# Patient Record
Sex: MALE | Race: BLACK OR AFRICAN AMERICAN | URBAN - METROPOLITAN AREA
[De-identification: names, ages, dates, MRNs, and addresses within clinical notes are randomized per-mention and may not be internally consistent; named-entity substitution may affect disease eponyms.]

---

## 2018-01-02 ENCOUNTER — HISTORICAL (OUTPATIENT)
Dept: INTERNAL MEDICINE | Facility: CLINIC | Age: 53
End: 2018-01-02

## 2018-01-02 LAB
ABS NEUT (OLG): 3.55 X10(3)/MCL (ref 2.1–9.2)
ALBUMIN SERPL-MCNC: 3.7 GM/DL (ref 3.4–5)
ALBUMIN/GLOB SERPL: 1 RATIO (ref 1–2)
ALP SERPL-CCNC: 82 UNIT/L (ref 45–117)
ALT SERPL-CCNC: 46 UNIT/L (ref 12–78)
APPEARANCE, UA: CLEAR
AST SERPL-CCNC: 20 UNIT/L (ref 15–37)
BACTERIA #/AREA URNS AUTO: ABNORMAL /[HPF]
BASOPHILS # BLD AUTO: 0.03 X10(3)/MCL
BASOPHILS NFR BLD AUTO: 0 % (ref 0–1)
BILIRUB SERPL-MCNC: 0.6 MG/DL (ref 0.2–1)
BILIRUB UR QL STRIP: NEGATIVE
BILIRUBIN DIRECT+TOT PNL SERPL-MCNC: 0.1 MG/DL
BILIRUBIN DIRECT+TOT PNL SERPL-MCNC: 0.5 MG/DL
BUN SERPL-MCNC: 14 MG/DL (ref 7–18)
CALCIUM SERPL-MCNC: 8.5 MG/DL (ref 8.5–10.1)
CHLORIDE SERPL-SCNC: 105 MMOL/L (ref 98–107)
CHOLEST SERPL-MCNC: 180 MG/DL
CHOLEST/HDLC SERPL: 3.7 {RATIO} (ref 0–5)
CO2 SERPL-SCNC: 32 MMOL/L (ref 21–32)
COLOR UR: YELLOW
CREAT SERPL-MCNC: 0.8 MG/DL (ref 0.6–1.3)
EOSINOPHIL # BLD AUTO: 0.14 10*3/UL
EOSINOPHIL NFR BLD AUTO: 2 % (ref 0–5)
ERYTHROCYTE [DISTWIDTH] IN BLOOD BY AUTOMATED COUNT: 11.8 % (ref 11.5–14.5)
GLOBULIN SER-MCNC: 4.2 GM/ML (ref 2.3–3.5)
GLUCOSE (UA): >1000 MG/DL
GLUCOSE SERPL-MCNC: 203 MG/DL (ref 74–106)
HCT VFR BLD AUTO: 45.7 % (ref 40–51)
HDLC SERPL-MCNC: 49 MG/DL
HGB BLD-MCNC: 16.1 GM/DL (ref 13.5–17.5)
HGB UR QL STRIP: NEGATIVE
HIV 1+2 AB+HIV1 P24 AG SERPL QL IA: NONREACTIVE
HYALINE CASTS #/AREA URNS LPF: ABNORMAL /[LPF]
IMM GRANULOCYTES # BLD AUTO: 0.02 10*3/UL
IMM GRANULOCYTES NFR BLD AUTO: 0 %
KETONES UR QL STRIP: ABNORMAL
LDLC SERPL CALC-MCNC: 103 MG/DL (ref 0–130)
LEUKOCYTE ESTERASE UR QL STRIP: NEGATIVE
LYMPHOCYTES # BLD AUTO: 1.88 X10(3)/MCL
LYMPHOCYTES NFR BLD AUTO: 31 % (ref 15–40)
MCH RBC QN AUTO: 31.4 PG (ref 26–34)
MCHC RBC AUTO-ENTMCNC: 35.2 GM/DL (ref 31–37)
MCV RBC AUTO: 89.3 FL (ref 80–100)
MONOCYTES # BLD AUTO: 0.5 X10(3)/MCL
MONOCYTES NFR BLD AUTO: 8 % (ref 4–12)
NEUTROPHILS # BLD AUTO: 3.55 X10(3)/MCL
NEUTROPHILS NFR BLD AUTO: 58 X10(3)/MCL
NITRITE UR QL STRIP: NEGATIVE
PH UR STRIP: 5.5 [PH] (ref 4.5–8)
PLATELET # BLD AUTO: 185 X10(3)/MCL (ref 130–400)
PMV BLD AUTO: 10.8 FL (ref 7.4–10.4)
POTASSIUM SERPL-SCNC: 4.2 MMOL/L (ref 3.5–5.1)
PROT SERPL-MCNC: 7.9 GM/DL (ref 6.4–8.2)
PROT UR QL STRIP: 20 MG/DL
PSA SERPL-MCNC: 0.8 NG/ML
RBC # BLD AUTO: 5.12 X10(6)/MCL (ref 4.5–5.9)
RBC #/AREA URNS AUTO: ABNORMAL /[HPF]
SODIUM SERPL-SCNC: 141 MMOL/L (ref 136–145)
SP GR UR STRIP: 1.04 (ref 1–1.03)
SQUAMOUS #/AREA URNS LPF: ABNORMAL /[LPF]
TRIGL SERPL-MCNC: 141 MG/DL
TSH SERPL-ACNC: 0.99 MIU/L (ref 0.36–3.74)
UROBILINOGEN UR STRIP-ACNC: NORMAL
VLDLC SERPL CALC-MCNC: 28 MG/DL
WBC # SPEC AUTO: 6.1 X10(3)/MCL (ref 4.5–11)
WBC #/AREA URNS AUTO: ABNORMAL /HPF

## 2018-01-10 ENCOUNTER — HISTORICAL (OUTPATIENT)
Dept: INTERNAL MEDICINE | Facility: CLINIC | Age: 53
End: 2018-01-10

## 2018-01-15 ENCOUNTER — HISTORICAL (OUTPATIENT)
Dept: INTERNAL MEDICINE | Facility: CLINIC | Age: 53
End: 2018-01-15

## 2018-01-15 LAB
EST. AVERAGE GLUCOSE BLD GHB EST-MCNC: 237 MG/DL
HAV IGM SERPL QL IA: NONREACTIVE
HBA1C MFR BLD: 9.9 % (ref 4.2–6.3)
HBV CORE IGM SERPL QL IA: NONREACTIVE
HBV SURFACE AG SERPL QL IA: NEGATIVE
HCV AB SERPL QL IA: NONREACTIVE

## 2018-08-06 ENCOUNTER — HISTORICAL (OUTPATIENT)
Dept: ADMINISTRATIVE | Facility: HOSPITAL | Age: 53
End: 2018-08-06

## 2018-12-03 ENCOUNTER — HISTORICAL (OUTPATIENT)
Dept: INTERNAL MEDICINE | Facility: CLINIC | Age: 53
End: 2018-12-03

## 2018-12-03 LAB
ALBUMIN SERPL-MCNC: 3.5 GM/DL (ref 3.4–5)
ALBUMIN/GLOB SERPL: 1 RATIO (ref 1–2)
ALP SERPL-CCNC: 114 UNIT/L (ref 45–117)
ALT SERPL-CCNC: 90 UNIT/L (ref 12–78)
AST SERPL-CCNC: 30 UNIT/L (ref 15–37)
BILIRUB SERPL-MCNC: 0.7 MG/DL (ref 0.2–1)
BILIRUBIN DIRECT+TOT PNL SERPL-MCNC: 0.2 MG/DL
BILIRUBIN DIRECT+TOT PNL SERPL-MCNC: 0.5 MG/DL
BUN SERPL-MCNC: 14 MG/DL (ref 7–18)
CALCIUM SERPL-MCNC: 8.2 MG/DL (ref 8.5–10.1)
CHLORIDE SERPL-SCNC: 108 MMOL/L (ref 98–107)
CO2 SERPL-SCNC: 32 MMOL/L (ref 21–32)
CREAT SERPL-MCNC: 0.9 MG/DL (ref 0.6–1.3)
EST. AVERAGE GLUCOSE BLD GHB EST-MCNC: 177 MG/DL
GLOBULIN SER-MCNC: 3.9 GM/ML (ref 2.3–3.5)
GLUCOSE SERPL-MCNC: 123 MG/DL (ref 74–106)
HBA1C MFR BLD: 7.8 % (ref 4.2–6.3)
POTASSIUM SERPL-SCNC: 4.1 MMOL/L (ref 3.5–5.1)
PROT SERPL-MCNC: 7.4 GM/DL (ref 6.4–8.2)
SODIUM SERPL-SCNC: 143 MMOL/L (ref 136–145)

## 2019-09-23 ENCOUNTER — HISTORICAL (OUTPATIENT)
Dept: INTERNAL MEDICINE | Facility: CLINIC | Age: 54
End: 2019-09-23

## 2019-09-23 LAB
ABS NEUT (OLG): 4.7 X10(3)/MCL (ref 2.1–9.2)
ALBUMIN SERPL-MCNC: 3.5 GM/DL (ref 3.4–5)
ALBUMIN/GLOB SERPL: 0.9 RATIO (ref 1.1–2)
ALP SERPL-CCNC: 88 UNIT/L (ref 45–117)
ALT SERPL-CCNC: 30 UNIT/L (ref 12–78)
AST SERPL-CCNC: 17 UNIT/L (ref 15–37)
BASOPHILS # BLD AUTO: 0 X10(3)/MCL (ref 0–0.2)
BASOPHILS NFR BLD AUTO: 0 %
BILIRUB SERPL-MCNC: 0.5 MG/DL (ref 0.2–1)
BILIRUBIN DIRECT+TOT PNL SERPL-MCNC: 0.2 MG/DL (ref 0–0.2)
BILIRUBIN DIRECT+TOT PNL SERPL-MCNC: 0.3 MG/DL
BUN SERPL-MCNC: 16 MG/DL (ref 7–18)
CALCIUM SERPL-MCNC: 8.7 MG/DL (ref 8.5–10.1)
CHLORIDE SERPL-SCNC: 108 MMOL/L (ref 98–107)
CHOLEST SERPL-MCNC: 102 MG/DL
CHOLEST/HDLC SERPL: 2.6 {RATIO} (ref 0–5)
CO2 SERPL-SCNC: 30 MMOL/L (ref 21–32)
CREAT SERPL-MCNC: 0.9 MG/DL (ref 0.6–1.3)
EOSINOPHIL # BLD AUTO: 0.2 X10(3)/MCL (ref 0–0.9)
EOSINOPHIL NFR BLD AUTO: 2 %
ERYTHROCYTE [DISTWIDTH] IN BLOOD BY AUTOMATED COUNT: 12.4 % (ref 11.5–14.5)
EST. AVERAGE GLUCOSE BLD GHB EST-MCNC: 120 MG/DL
GLOBULIN SER-MCNC: 4 GM/ML (ref 2.3–3.5)
GLUCOSE SERPL-MCNC: 84 MG/DL (ref 74–106)
HBA1C MFR BLD: 5.8 % (ref 4.2–6.3)
HCT VFR BLD AUTO: 41.3 % (ref 40–51)
HDLC SERPL-MCNC: 40 MG/DL (ref 40–59)
HGB BLD-MCNC: 13.9 GM/DL (ref 13.5–17.5)
IMM GRANULOCYTES # BLD AUTO: 0.02 10*3/UL
IMM GRANULOCYTES NFR BLD AUTO: 0 %
LDLC SERPL CALC-MCNC: 49 MG/DL
LYMPHOCYTES # BLD AUTO: 2 X10(3)/MCL (ref 0.6–4.6)
LYMPHOCYTES NFR BLD AUTO: 26 %
MCH RBC QN AUTO: 31.5 PG (ref 26–34)
MCHC RBC AUTO-ENTMCNC: 33.7 GM/DL (ref 31–37)
MCV RBC AUTO: 93.7 FL (ref 80–100)
MONOCYTES # BLD AUTO: 0.7 X10(3)/MCL (ref 0.1–1.3)
MONOCYTES NFR BLD AUTO: 10 %
NEUTROPHILS # BLD AUTO: 4.7 X10(3)/MCL (ref 2.1–9.2)
NEUTROPHILS NFR BLD AUTO: 62 %
PLATELET # BLD AUTO: 212 X10(3)/MCL (ref 130–400)
PMV BLD AUTO: 10.5 FL (ref 7.4–10.4)
POTASSIUM SERPL-SCNC: 4.2 MMOL/L (ref 3.5–5.1)
PROT SERPL-MCNC: 7.5 GM/DL (ref 6.4–8.2)
PSA SERPL-MCNC: 0.7 NG/ML
RBC # BLD AUTO: 4.41 X10(6)/MCL (ref 4.5–5.9)
SODIUM SERPL-SCNC: 144 MMOL/L (ref 136–145)
TRIGL SERPL-MCNC: 67 MG/DL
TSH SERPL-ACNC: 1.73 MIU/L (ref 0.36–3.74)
VLDLC SERPL CALC-MCNC: 13 MG/DL
WBC # SPEC AUTO: 7.6 X10(3)/MCL (ref 4.5–11)

## 2022-04-11 ENCOUNTER — HISTORICAL (OUTPATIENT)
Dept: ADMINISTRATIVE | Facility: HOSPITAL | Age: 57
End: 2022-04-11

## 2022-04-27 VITALS
OXYGEN SATURATION: 99 % | SYSTOLIC BLOOD PRESSURE: 112 MMHG | HEIGHT: 67 IN | BODY MASS INDEX: 32.53 KG/M2 | DIASTOLIC BLOOD PRESSURE: 70 MMHG | WEIGHT: 207.25 LBS

## 2022-05-05 NOTE — HISTORICAL [facility] CERNER
This is a historical note converted from [employer]. Formatting and pictures may have been removed.  Please reference [employer] for original formatting and attached multimedia. Chief Complaint  pain rt leg and groin area/fall precautions/did not taken blood pressure medication this am  History of Present Illness  61y/o male here for [1] f/u. Pt has hx HTN, DM, Right hip pain, CVA with left sided weakness.? Pt ambulating with walker. [2]? States he is currently in PT and was informed by his Physical therapist that he needs sx and not PT. Informed pt referral to [facility] was denied and informed pt needed PT first. [3] Pt did not take BP meds today and reason for elevated BP.  Review of Systems  Constitutional: negative except as stated in HPI  Eye: negative except as stated in HPI  ENT: negative except as stated in HPI  Respiratory: negative except as stated in HPI  Cardiovascular: negative except as stated in HPI  Gastrointestinal: negative except as stated in HPI  Genitourinary: negative except as stated in HPI  Heme/Lymph: negative except as stated in HPI  Endocrine: negative except as stated in HPI  Immunologic: negative except as stated in HPI  Musculoskeletal: negative except as stated in HPI  Integumentary: negative except as stated in HPI  Neurologic: negative except as stated in HPI  ?   All Other ROS_ negative except as stated in HPI  ?  Physical Exam  Vitals & Measurements  T:?36.5? ?C (Oral)? HR:?55(Peripheral)? RR:?18? BP:?160/80?  HT:?170?cm? HT:?170?cm? WT:?95?kg? WT:?95?kg? BMI:?32.87?  General: Alert and oriented, No acute distress.  Eye: Pupils are equal, round and reactive to light, Extraocular movements are intact.  HENT: Normocephalic.  Neck: Supple, Non-tender, No carotid bruit, No lymphadenopathy.  Respiratory: Lungs are clear to auscultation, Respirations are non-labored, Breath sounds are equal, Symmetrical chest wall expansion.  Cardiovascular: Normal rate, Regular rhythm, No  murmur.  Gastrointestinal: Soft, Non-tender, Non-distended, Normal bowel sounds.  Musculoskeletal: + weakness with strength to left upper and lower ext. + improvement since last visit with strength since last visit. Pt still unstable with ambulation on left side.  Integumentary: Warm, Dry, Intact.  Neurologic: No focal deficits, Cranial Nerves II-XII are grossly intact.  Assessment/Plan  Avascular necrosis of right femur  ?External referral to [facility] cl for eval and mgmt. Pt has been attending PT but still having right hip pain. Pt has attending Physical therapy but still has pain to right hip. Refer to [facility] cl for eval and mgmt of right hip pain and Avascular necrosis.  Ordered:  Clinic Follow up, *Est. 11/06/18 3:00:00 CST, in 3 months with MARVIN Rendon, Order for future visit, DM (diabetes mellitus), type 2, uncontrolled  HTN (hypertension)  Left-sided weakness  Obesity  Right hip pain  Status post CVA  Tobacco abuse counseling  Well ad...  External Referral, Right hip pain/ Avascular necrosis, Ortho, Ortho cl in [facility], 08/06/18 9:33:00 CDT, Right hip pain  Avascular necrosis of right femur  Office/Outpatient Visit Level 4 Established [id number] PC, DM (diabetes mellitus), type 2, uncontrolled  HTN (hypertension)  Left-sided weakness  Obesity  Right hip pain  Status post CVA  Tobacco abuse counseling  Well adult exam  Avascular necrosis of right femur, [facility], 08/06/18 9:45:00 CDT  XR Hip Right 2 Views, Routine, *Est. 08/06/18 3:00:00 CDT, Pain, None, Wheelchair, Rad Type, Order for future visit, Avascular necrosis of right femur  Right hip pain, Not Scheduled, *Est. 08/06/18 3:00:00 CDT  ?  DM (diabetes mellitus), type 2, uncontrolled  ?ADA diet and exercise. A1c 8.7. Will repeat A1c in 3 months. [4]  Ordered:  Clinic Follow up, *Est. 11/06/18 3:00:00 CST, in 3 months with MARVIN Rendon, Order for future visit, DM (diabetes mellitus), type 2, uncontrolled  HTN (hypertension)   Left-sided weakness  Obesity  Right hip pain  Status post CVA  Tobacco abuse counseling  Well ad...  Hemoglobin A1C [facility], Routine collect, *Est. 11/06/18 3:00:00 CST, Blood, Order for future visit, *Est. Stop date 11/06/18 3:00:00 CST, Lab Collect, DM (diabetes mellitus), type 2, uncontrolled, 08/06/18 9:40:00 CDT  Office/Outpatient Visit Level 4 Established [id number] PC, DM (diabetes mellitus), type 2, uncontrolled  HTN (hypertension)  Left-sided weakness  Obesity  Right hip pain  Status post CVA  Tobacco abuse counseling  Well adult exam  Avascular necrosis of right femur, [facility], 08/06/18 9:45:00 CDT  ?  HTN (hypertension)  ?BP elevated. Pt did not take BP med this AM. Take med as prescribed. Low fat low salt diet and exercise. Cont BP med as prescribed. Instructed on importance of controlling BP to prevent CVA re-occurrence. [5] RTC in 4 weeks for BP check.  Ordered:  Clinic Follow up, *Est. 09/03/18 3:00:00 CDT, 4 weeks BP check nurse visit, Order for future visit, HTN (hypertension), [facility]  Clinic Follow up, *Est. 11/06/18 3:00:00 CST, in 3 months with MARVIN Rendon, Order for future visit, DM (diabetes mellitus), type 2, uncontrolled  HTN (hypertension)  Left-sided weakness  Obesity  Right hip pain  Status post CVA  Tobacco abuse counseling  Well ad...  Comprehensive Metabolic Panel, Routine collect, *Est. 11/06/18 3:00:00 CST, Blood, Order for future visit, *Est. Stop date 11/06/18 3:00:00 CST, Lab Collect, HTN (hypertension), 08/06/18 9:40:00 CDT  Office/Outpatient Visit Level 4 Established [id number] PC, DM (diabetes mellitus), type 2, uncontrolled  HTN (hypertension)  Left-sided weakness  Obesity  Right hip pain  Status post CVA  Tobacco abuse counseling  Well adult exam  Avascular necrosis of right femur, [facility], 08/06/18 9:45:00 CDT  ?  Left-sided weakness  ?Pt s/p CVA with left sided weakness X 1 week. Improvement noted in left sided weakness since starting PT.  Cont?ambulating with?cane and walker?as directed to prevent falls. [6] Cont PT as ordered.  Ordered:  Clinic Follow up, *Est. 11/06/18 3:00:00 CST, in 3 months with MARVIN Rendon, Order for future visit, DM (diabetes mellitus), type 2, uncontrolled  HTN (hypertension)  Left-sided weakness  Obesity  Right hip pain  Status post CVA  Tobacco abuse counseling  Well ad...  Office/Outpatient Visit Level 4 Established [id number] PC, DM (diabetes mellitus), type 2, uncontrolled  HTN (hypertension)  Left-sided weakness  Obesity  Right hip pain  Status post CVA  Tobacco abuse counseling  Well adult exam  Avascular necrosis of right femur, [facility], 08/06/18 9:45:00 CDT  ?  Obesity  ?? Encouraged low fat diet and exercise. Education provided. [7]  Ordered:  Clinic Follow up, *Est. 11/06/18 3:00:00 CST, in 3 months with MARVIN Rendon, Order for future visit, DM (diabetes mellitus), type 2, uncontrolled  HTN (hypertension)  Left-sided weakness  Obesity  Right hip pain  Status post CVA  Tobacco abuse counseling  Well ad...  Office/Outpatient Visit Level 4 Established [id number] PC, DM (diabetes mellitus), type 2, uncontrolled  HTN (hypertension)  Left-sided weakness  Obesity  Right hip pain  Status post CVA  Tobacco abuse counseling  Well adult exam  Avascular necrosis of right femur, [facility], 08/06/18 9:45:00 CDT  ?  Right hip pain  ?? Cont PT as prescribed as referral to?[facility] denied referral without?trying PT first. Cont pain med as prescribed. [8] Pt has attending Physical therapy but still has pain to right hip. Refer to [facility] cl for eval and mgmt of right hip pain and Avascular necrosis.  Ordered:  Clinic Follow up, *Est. 11/06/18 3:00:00 CST, in 3 months with MARVIN Rendon, Order for future visit, DM (diabetes mellitus), type 2, uncontrolled  HTN (hypertension)  Left-sided weakness  Obesity  Right hip pain  Status post CVA  Tobacco abuse counseling  Well ad...  External  Referral, Right hip pain/ Avascular necrosis, Ortho, Ortho cl in [facility], 08/06/18 9:33:00 CDT, Right hip pain  Avascular necrosis of right femur  Office/Outpatient Visit Level 4 Established [id number] PC, DM (diabetes mellitus), type 2, uncontrolled  HTN (hypertension)  Left-sided weakness  Obesity  Right hip pain  Status post CVA  Tobacco abuse counseling  Well adult exam  Avascular necrosis of right femur, [facility], 08/06/18 9:45:00 CDT  XR Hip Right 2 Views, Routine, *Est. 08/06/18 3:00:00 CDT, Pain, None, Wheelchair, Rad Type, Order for future visit, Avascular necrosis of right femur  Right hip pain, Not Scheduled, *Est. 08/06/18 3:00:00 CDT  ?  Status post CVA  ?Instructed on importance of controlling BP and CBGs to prevent re-occurrence of CVA. Also encouraged smoking cessation. [9]  Ordered:  Clinic Follow up, *Est. 11/06/18 3:00:00 CST, in 3 months with MARVIN Rendon, Order for future visit, DM (diabetes mellitus), type 2, uncontrolled  HTN (hypertension)  Left-sided weakness  Obesity  Right hip pain  Status post CVA  Tobacco abuse counseling  Well ad...  Office/Outpatient Visit Level 4 Established [id number] PC, DM (diabetes mellitus), type 2, uncontrolled  HTN (hypertension)  Left-sided weakness  Obesity  Right hip pain  Status post CVA  Tobacco abuse counseling  Well adult exam  Avascular necrosis of right femur, [facility], 08/06/18 9:45:00 CDT  ?  Tobacco abuse counseling  ?? Encouraged smoking cessation. Pt states he will quit on his own. [10]  Ordered:  Clinic Follow up, *Est. 11/06/18 3:00:00 CST, in 3 months with MARVIN Rendon, Order for future visit, DM (diabetes mellitus), type 2, uncontrolled  HTN (hypertension)  Left-sided weakness  Obesity  Right hip pain  Status post CVA  Tobacco abuse counseling  Well ad...  Office/Outpatient Visit Level 4 Established [id number] PC, DM (diabetes mellitus), type 2, uncontrolled  HTN (hypertension)  Left-sided weakness  Obesity  Right hip  pain  Status post CVA  Tobacco abuse counseling  Well adult exam  Avascular necrosis of right femur, [facility], 08/06/18 9:45:00 CDT  ?  Well adult exam  ?Labs in 3 months. RTC in 4 weeks for BP check.  Ordered:  Clinic Follow up, *Est. 11/06/18 3:00:00 CST, in 3 months with MARVIN Rendon, Order for future visit, DM (diabetes mellitus), type 2, uncontrolled  HTN (hypertension)  Left-sided weakness  Obesity  Right hip pain  Status post CVA  Tobacco abuse counseling  Well ad...  Office/Outpatient Visit Level 4 Established [id number] PC, DM (diabetes mellitus), type 2, uncontrolled  HTN (hypertension)  Left-sided weakness  Obesity  Right hip pain  Status post CVA  Tobacco abuse counseling  Well adult exam  Avascular necrosis of right femur, [facility], 08/06/18 9:45:00 CDT  ?  Orders:  atorvastatin, 20 mg = 1 tab(s), Oral, Daily, # 90 tab(s), 3 Refill(s), Pharmacy: [facility] [id number]  RTC in 4 weeks for BP check.  RTC in 3 months with labs 1 week prior to appt.   Problem List/Past Medical History  Ongoing  Hip pain  Obesity  Historical  Hypertension  Procedure/Surgical History  Incision and drainage of abscess (eg, carbuncle, suppurative hidradenitis, cutaneous or subcutaneous abscess, cyst, furuncle, or paronychia); simple or single. (10/24/2014)  Other incision with drainage of skin and subcutaneous tissue (10/24/2014)  HOSPITAL OBSERVATION SERVICE, PER HOUR (01/26/2013)  Cholecystectomy   Medications  Accu check Hilda blood glucose meter kit, See Instructions  Accu check Hilda blood glucose test strips and lancets, See Instructions, 11 refills  amlodipine 5 mg oral tablet, 5 mg= 1 tab(s), Oral, Daily, 11 refills  aspirin 325 mg oral tablet, 325 mg= 1 tab(s), Oral, Daily  ASPIRIN 325MG EC TABLETS, 325 mg= 1 tab(s), Oral, Daily  atorvastatin 20 mg oral tablet, 20 mg= 1 tab(s), Oral, Daily  glipiZIDE 5 mg oral tablet, 10 mg= 2 tab(s), Oral, BID, 11 refills  hydrochlorothiazide 12.5 mg oral  tablet, 12.5 mg= 1 tab(s), Oral, Daily, 11 refills  lisinopril 10 mg oral tablet, 10 mg= 1 tab(s), Oral, Daily, 11 refills  MELOXICAM 15MG TABLETS, 15 mg= 1 tab(s), Oral, Daily  metFORMIN 500 mg oral tablet, extended release, 500 mg= 1 tab(s), Oral, BID, 11 refills  Allergies  No Known Allergies  Social History  Alcohol - High Risk, 10/12/2012  Current, Beer, Daily, Previous treatment: None., 01/26/2013  Employment/School  Unemployed, 01/02/2018  Exercise  Self assessment: Fair condition., 03/07/2018  Exercise duration: 5. Exercise frequency: 3-4 times/week. Self assessment: Fair condition. Exercise type: Walking, bike ride., 02/02/2018  Home/Environment  Lives with Children. Alcohol abuse in household: No. Substance abuse in household: No. Smoker in household: Yes. Injuries/Abuse/Neglect in household: No. Feels unsafe at home: No. Safe place to go: Yes., 01/02/2018  Nutrition/Health  Type of diet: low sodium., 03/07/2018  Regular, 01/02/2018  Substance Abuse - Low Risk, 09/24/2014  Current, Marijuana, 04/30/2015  Tobacco - High Risk, 10/12/2012  Current, Cigarettes, 6 per day. 20 year(s)., 01/26/2013  Family History  Family history is unknown  Health Maintenance  Health Maintenance  ???Pending?(in the next year)  ??? ??OverDue  ??? ? ? ?Coronary Artery Disease Maintenance-Antiplatelet Agent Prescribed due??and every?  ??? ? ? ?Smoking Cessation (Coronary Artery Disease) due??10/23/16??and every 2??year(s)  ??? ? ? ?Smoking Cessation (Diabetes) due??10/23/16??and every 2??year(s)  ??? ??Due?  ??? ? ? ?Colorectal Screening due??08/06/18??and every?  ??? ? ? ?Coronary Artery Disease Maintenance-Lipid Lowering Therapy due??08/06/18??and every?  ??? ? ? ?Diabetes Maintenance-Eye Exam due??08/06/18??and every?  ??? ? ? ?Diabetes Maintenance-Foot Exam due??08/06/18??and every?  ??? ? ? ?Influenza Vaccine due??08/06/18??and every?  ??? ? ? ?Zoster Vaccine due??08/06/18??and every 100??year(s)  ??? ??Refused?  ??? ? ?  ?Tetanus Vaccine due??08/06/18??and every 10??year(s)  ??? ??Due In Future?  ??? ? ? ?Alcohol Misuse Screening not due until??01/02/19??and every 1??year(s)  ??? ? ? ?Diabetes Maintenance-Fasting Lipid Profile not due until??04/25/19??and every 1??year(s)  ??? ? ? ?Diabetes Maintenance-HgbA1c not due until??04/25/19??and every 1??year(s)  ??? ? ? ?Hypertension Management-BMP not due until??04/25/19??and every 1??year(s)  ??? ? ? ?Aspirin Therapy for CVD Prevention not due until??05/03/19??and every 1??year(s)  ??? ? ? ?Blood Pressure Screening not due until??05/03/19??and every 1??year(s)  ??? ? ? ?Body Mass Index Check not due until??05/03/19??and every 1??year(s)  ??? ? ? ?Depression Screening not due until??05/03/19??and every 1??year(s)  ??? ? ? ?Hypertension Management-Blood Pressure not due until??05/03/19??and every 1??year(s)  ??? ? ? ?Obesity Screening not due until??05/03/19??and every 1??year(s)  ???Satisfied?(in the past 1 year)  ??? ??Satisfied?  ??? ? ? ?Alcohol Misuse Screening on??01/02/18.??Satisfied by Nicolette Rendon NP.  ??? ? ? ?Aspirin Therapy for CVD Prevention on??05/03/18.  ??? ? ? ?Blood Pressure Screening on??05/03/18.??Satisfied by Elsie Willis LPN  ??? ? ? ?Body Mass Index Check on??05/03/18.??Satisfied by Elsie Willis LPN  ??? ? ? ?Coronary Artery Disease Maintenance-Antiplatelet Agent Prescribed on??05/03/18.  ??? ? ? ?Depression Screening on??05/03/18.??Satisfied by Elsie Willis LPN  ??? ? ? ?Diabetes Maintenance-Fasting Lipid Profile on??04/25/18.??Satisfied by Tong Myers  ??? ? ? ?Diabetes Maintenance-HgbA1c on??08/03/18.??Satisfied by Nicolette Rendon NP.  ??? ? ? ?Diabetes Screening on??04/25/18.??Satisfied by Colette Camarillo MT  ??? ? ? ?Hypertension Management-Blood Pressure on??05/03/18.??Satisfied by Elsie Willis LPN  ??? ? ? ?Hypertension Management-BMP on??04/25/18.??Satisfied by Lillian Snyder  ??? ? ?  ?Lipid Screening on??04/25/18.??Satisfied by Colette Camarillo MT  ??? ? ? ?Obesity Screening on??05/03/18.??Satisfied by Elsie Willis LPN Jyothi  ??? ? ? ?Smoking Cessation (Coronary Artery Disease) on??05/03/18.??Satisfied by Lazaro RENUN, Elsie Jyothi  ??? ? ? ?Smoking Cessation (Diabetes) on??05/03/18.??Satisfied by Lazaro RENUN, Elsie Jyothi  ??? ??Refused?  ??? ? ? ?Influenza Vaccine on??01/02/18.??Recorded for Julieta NP, Nicolette BLANCAS.??Reason: Patient Refuses  ??? ? ? ?Tetanus Vaccine on??01/02/18.??Recorded for Julieta NP, Nicolette BLANCAS.??Reason: Patient Refuses  ?  ?     [1]?Office Visit Note; Julieta NP, Nicolette BALNCAS. 05/03/2018 15:00 CDT  [2]?Office Visit Note; Julieta NP, Nicolette BLANCAS. 05/03/2018 15:00 CDT  [3]?Office Visit Note; Julieta NP, Nicolette M. 05/03/2018 15:00 CDT  [4]?Office Visit Note; Julieta NP, Nicolette M. 05/03/2018 15:00 CDT  [5]?Office Visit Note; Julieta NP, Nicolette M. 05/03/2018 15:00 CDT  [6]?Office Visit Note; Julieta NP, Nicolette M. 05/03/2018 15:00 CDT  [7]?Office Visit Note; Julieta NP, Nicolette M. 05/03/2018 15:00 CDT  [8]?Office Visit Note; Julieta NP, Nicolette M. 05/03/2018 15:00 CDT  [9]?Office Visit Note; Julieta NP, Nicolette M. 05/03/2018 15:00 CDT  [10]?Office Visit Note; Julieta NP, Nicolette BLANCAS. 05/03/2018 15:00 CDT

## 2022-08-04 ENCOUNTER — LAB REQUISITION (OUTPATIENT)
Dept: LAB | Facility: HOSPITAL | Age: 57
End: 2022-08-04

## 2022-08-04 DIAGNOSIS — E11.9 TYPE 2 DIABETES MELLITUS WITHOUT COMPLICATIONS: ICD-10-CM

## 2022-08-04 LAB
EST. AVERAGE GLUCOSE BLD GHB EST-MCNC: 145.6 MG/DL
HBA1C MFR BLD: 6.7 %

## 2022-08-04 PROCEDURE — 83036 HEMOGLOBIN GLYCOSYLATED A1C: CPT | Performed by: INTERNAL MEDICINE

## 2023-10-25 ENCOUNTER — LAB REQUISITION (OUTPATIENT)
Dept: LAB | Facility: HOSPITAL | Age: 58
End: 2023-10-25
Payer: MEDICARE

## 2023-10-25 DIAGNOSIS — E11.40 TYPE 2 DIABETES MELLITUS WITH DIABETIC NEUROPATHY, UNSPECIFIED: ICD-10-CM

## 2023-10-25 LAB
ALBUMIN SERPL-MCNC: 3.8 G/DL (ref 3.5–5)
ALBUMIN/GLOB SERPL: 1.2 RATIO (ref 1.1–2)
ALP SERPL-CCNC: 85 UNIT/L (ref 40–150)
ALT SERPL-CCNC: 32 UNIT/L (ref 0–55)
AST SERPL-CCNC: 24 UNIT/L (ref 5–34)
BILIRUB SERPL-MCNC: 0.5 MG/DL
BUN SERPL-MCNC: 15.5 MG/DL (ref 8.4–25.7)
CALCIUM SERPL-MCNC: 9.1 MG/DL (ref 8.4–10.2)
CHLORIDE SERPL-SCNC: 109 MMOL/L (ref 98–107)
CO2 SERPL-SCNC: 28 MMOL/L (ref 22–29)
CREAT SERPL-MCNC: 0.87 MG/DL (ref 0.73–1.18)
CREAT UR-MCNC: 99.6 MG/DL (ref 63–166)
EST. AVERAGE GLUCOSE BLD GHB EST-MCNC: 177.2 MG/DL
GFR SERPLBLD CREATININE-BSD FMLA CKD-EPI: >60 MLS/MIN/1.73/M2
GLOBULIN SER-MCNC: 3.3 GM/DL (ref 2.4–3.5)
GLUCOSE SERPL-MCNC: 122 MG/DL (ref 74–100)
HBA1C MFR BLD: 7.8 %
MICROALBUMIN UR-MCNC: <5 UG/ML
MICROALBUMIN/CREAT RATIO PNL UR: NORMAL
POTASSIUM SERPL-SCNC: 4.5 MMOL/L (ref 3.5–5.1)
PROT SERPL-MCNC: 7.1 GM/DL (ref 6.4–8.3)
SODIUM SERPL-SCNC: 142 MMOL/L (ref 136–145)

## 2023-10-25 PROCEDURE — 82043 UR ALBUMIN QUANTITATIVE: CPT | Performed by: NURSE PRACTITIONER

## 2023-10-25 PROCEDURE — 83721 ASSAY OF BLOOD LIPOPROTEIN: CPT | Performed by: NURSE PRACTITIONER

## 2023-10-25 PROCEDURE — 80053 COMPREHEN METABOLIC PANEL: CPT | Performed by: NURSE PRACTITIONER

## 2023-10-25 PROCEDURE — 83036 HEMOGLOBIN GLYCOSYLATED A1C: CPT | Performed by: NURSE PRACTITIONER

## 2023-10-29 LAB — MAYO GENERIC ORDERABLE RESULT: NORMAL

## 2023-12-06 ENCOUNTER — LAB REQUISITION (OUTPATIENT)
Dept: LAB | Facility: HOSPITAL | Age: 58
End: 2023-12-06
Payer: MEDICARE

## 2023-12-06 DIAGNOSIS — I50.9 HEART FAILURE, UNSPECIFIED: ICD-10-CM

## 2023-12-06 DIAGNOSIS — E11.9 TYPE 2 DIABETES MELLITUS WITHOUT COMPLICATIONS: ICD-10-CM

## 2023-12-06 LAB
ALBUMIN SERPL-MCNC: 3.5 G/DL (ref 3.5–5)
ALBUMIN/GLOB SERPL: 1.2 RATIO (ref 1.1–2)
ALP SERPL-CCNC: 77 UNIT/L (ref 40–150)
ALT SERPL-CCNC: 41 UNIT/L (ref 0–55)
AST SERPL-CCNC: 34 UNIT/L (ref 5–34)
BILIRUB SERPL-MCNC: 0.6 MG/DL
BUN SERPL-MCNC: 13.3 MG/DL (ref 8.4–25.7)
CALCIUM SERPL-MCNC: 8.9 MG/DL (ref 8.4–10.2)
CHLORIDE SERPL-SCNC: 111 MMOL/L (ref 98–107)
CO2 SERPL-SCNC: 28 MMOL/L (ref 22–29)
CREAT SERPL-MCNC: 1.05 MG/DL (ref 0.73–1.18)
GFR SERPLBLD CREATININE-BSD FMLA CKD-EPI: >60 MLS/MIN/1.73/M2
GLOBULIN SER-MCNC: 3 GM/DL (ref 2.4–3.5)
GLUCOSE SERPL-MCNC: 221 MG/DL (ref 74–100)
POTASSIUM SERPL-SCNC: 4.1 MMOL/L (ref 3.5–5.1)
PROT SERPL-MCNC: 6.5 GM/DL (ref 6.4–8.3)
SODIUM SERPL-SCNC: 143 MMOL/L (ref 136–145)

## 2023-12-06 PROCEDURE — 80053 COMPREHEN METABOLIC PANEL: CPT | Performed by: NURSE PRACTITIONER

## 2024-03-12 ENCOUNTER — LAB REQUISITION (OUTPATIENT)
Dept: LAB | Facility: HOSPITAL | Age: 59
End: 2024-03-12

## 2024-03-12 DIAGNOSIS — E55.9 VITAMIN D DEFICIENCY, UNSPECIFIED: ICD-10-CM

## 2024-03-12 DIAGNOSIS — I87.2 VENOUS INSUFFICIENCY (CHRONIC) (PERIPHERAL): ICD-10-CM

## 2024-03-12 DIAGNOSIS — E11.40 TYPE 2 DIABETES MELLITUS WITH DIABETIC NEUROPATHY, UNSPECIFIED: ICD-10-CM

## 2024-03-12 DIAGNOSIS — I11.0 HYPERTENSIVE HEART DISEASE WITH HEART FAILURE: ICD-10-CM

## 2024-03-12 DIAGNOSIS — E78.5 HYPERLIPIDEMIA, UNSPECIFIED: ICD-10-CM

## 2024-03-12 LAB
ALBUMIN SERPL-MCNC: 3.4 G/DL (ref 3.5–5)
ALBUMIN/GLOB SERPL: 1.1 RATIO (ref 1.1–2)
ALP SERPL-CCNC: 83 UNIT/L (ref 40–150)
ALT SERPL-CCNC: 29 UNIT/L (ref 0–55)
AST SERPL-CCNC: 23 UNIT/L (ref 5–34)
BASOPHILS # BLD AUTO: 0.03 X10(3)/MCL
BASOPHILS NFR BLD AUTO: 0.5 %
BILIRUB SERPL-MCNC: 0.4 MG/DL
BUN SERPL-MCNC: 10.8 MG/DL (ref 8.4–25.7)
CALCIUM SERPL-MCNC: 8.6 MG/DL (ref 8.4–10.2)
CHLORIDE SERPL-SCNC: 110 MMOL/L (ref 98–107)
CHOLEST SERPL-MCNC: 189 MG/DL
CHOLEST/HDLC SERPL: 4 {RATIO} (ref 0–5)
CO2 SERPL-SCNC: 30 MMOL/L (ref 22–29)
CREAT SERPL-MCNC: 1 MG/DL (ref 0.73–1.18)
DEPRECATED CALCIDIOL+CALCIFEROL SERPL-MC: <3.5 NG/ML (ref 30–80)
EOSINOPHIL # BLD AUTO: 0.17 X10(3)/MCL (ref 0–0.9)
EOSINOPHIL NFR BLD AUTO: 3.1 %
ERYTHROCYTE [DISTWIDTH] IN BLOOD BY AUTOMATED COUNT: 12.5 % (ref 11.5–17)
EST. AVERAGE GLUCOSE BLD GHB EST-MCNC: 171.4 MG/DL
GFR SERPLBLD CREATININE-BSD FMLA CKD-EPI: >60 MLS/MIN/1.73/M2
GLOBULIN SER-MCNC: 3.1 GM/DL (ref 2.4–3.5)
GLUCOSE SERPL-MCNC: 130 MG/DL (ref 74–100)
HBA1C MFR BLD: 7.6 %
HCT VFR BLD AUTO: 45.2 % (ref 42–52)
HDLC SERPL-MCNC: 44 MG/DL (ref 35–60)
HGB BLD-MCNC: 15.2 G/DL (ref 14–18)
IMM GRANULOCYTES # BLD AUTO: 0.01 X10(3)/MCL (ref 0–0.04)
IMM GRANULOCYTES NFR BLD AUTO: 0.2 %
LDLC SERPL CALC-MCNC: 128 MG/DL (ref 50–140)
LYMPHOCYTES # BLD AUTO: 1.69 X10(3)/MCL (ref 0.6–4.6)
LYMPHOCYTES NFR BLD AUTO: 30.3 %
MCH RBC QN AUTO: 30.8 PG (ref 27–31)
MCHC RBC AUTO-ENTMCNC: 33.6 G/DL (ref 33–36)
MCV RBC AUTO: 91.7 FL (ref 80–94)
MONOCYTES # BLD AUTO: 0.53 X10(3)/MCL (ref 0.1–1.3)
MONOCYTES NFR BLD AUTO: 9.5 %
NEUTROPHILS # BLD AUTO: 3.14 X10(3)/MCL (ref 2.1–9.2)
NEUTROPHILS NFR BLD AUTO: 56.4 %
NRBC BLD AUTO-RTO: 0 %
PLATELET # BLD AUTO: 163 X10(3)/MCL (ref 130–400)
PMV BLD AUTO: 11.9 FL (ref 7.4–10.4)
POTASSIUM SERPL-SCNC: 4.5 MMOL/L (ref 3.5–5.1)
PROT SERPL-MCNC: 6.5 GM/DL (ref 6.4–8.3)
PSA SERPL-MCNC: 0.38 NG/ML
RBC # BLD AUTO: 4.93 X10(6)/MCL (ref 4.7–6.1)
SODIUM SERPL-SCNC: 144 MMOL/L (ref 136–145)
T4 FREE SERPL-MCNC: 0.92 NG/DL (ref 0.7–1.48)
TRIGL SERPL-MCNC: 87 MG/DL (ref 34–140)
TSH SERPL-ACNC: 1.34 UIU/ML (ref 0.35–4.94)
VLDLC SERPL CALC-MCNC: 17 MG/DL
WBC # SPEC AUTO: 5.57 X10(3)/MCL (ref 4.5–11.5)

## 2024-03-12 PROCEDURE — 84439 ASSAY OF FREE THYROXINE: CPT | Performed by: NURSE PRACTITIONER

## 2024-03-12 PROCEDURE — 83036 HEMOGLOBIN GLYCOSYLATED A1C: CPT | Performed by: NURSE PRACTITIONER

## 2024-03-12 PROCEDURE — 82306 VITAMIN D 25 HYDROXY: CPT | Performed by: NURSE PRACTITIONER

## 2024-03-12 PROCEDURE — 80061 LIPID PANEL: CPT | Performed by: NURSE PRACTITIONER

## 2024-03-12 PROCEDURE — 84153 ASSAY OF PSA TOTAL: CPT | Performed by: NURSE PRACTITIONER

## 2024-03-12 PROCEDURE — 84443 ASSAY THYROID STIM HORMONE: CPT | Performed by: NURSE PRACTITIONER

## 2024-03-12 PROCEDURE — 80053 COMPREHEN METABOLIC PANEL: CPT | Performed by: NURSE PRACTITIONER

## 2024-03-12 PROCEDURE — 85025 COMPLETE CBC W/AUTO DIFF WBC: CPT | Performed by: NURSE PRACTITIONER

## 2024-06-03 ENCOUNTER — HOSPITAL ENCOUNTER (EMERGENCY)
Facility: HOSPITAL | Age: 59
Discharge: HOME | End: 2024-06-03
Attending: INTERNAL MEDICINE
Payer: COMMERCIAL

## 2024-06-03 VITALS
TEMPERATURE: 97 F | HEIGHT: 67 IN | WEIGHT: 234 LBS | RESPIRATION RATE: 20 BRPM | SYSTOLIC BLOOD PRESSURE: 146 MMHG | HEART RATE: 71 BPM | OXYGEN SATURATION: 98 % | DIASTOLIC BLOOD PRESSURE: 92 MMHG | BODY MASS INDEX: 36.73 KG/M2
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
## 2024-06-03 DIAGNOSIS — S16.1XXA STRAIN OF NECK MUSCLE, INITIAL ENCOUNTER: ICD-10-CM

## 2024-06-03 DIAGNOSIS — M25.561 RIGHT KNEE PAIN: ICD-10-CM

## 2024-06-03 PROCEDURE — 96372 THER/PROPH/DIAG INJ SC/IM: CPT

## 2024-06-03 PROCEDURE — 99285 EMERGENCY DEPT VISIT HI MDM: CPT | Mod: 25

## 2024-06-03 PROCEDURE — 63600175 PHARM REV CODE 636 W HCPCS

## 2024-06-03 RX ORDER — METHOCARBAMOL 750 MG/1
750 TABLET, FILM COATED ORAL 3 TIMES DAILY PRN
Qty: 30 TABLET | Refills: 0 | Status: SHIPPED | OUTPATIENT
Start: 2024-06-03 | End: 2024-06-13

## 2024-06-03 RX ORDER — IBUPROFEN 800 MG/1
800 TABLET ORAL EVERY 6 HOURS PRN
Qty: 20 TABLET | Refills: 0 | Status: SHIPPED | OUTPATIENT
Start: 2024-06-03

## 2024-06-03 RX ORDER — KETOROLAC TROMETHAMINE 30 MG/ML
30 INJECTION, SOLUTION INTRAMUSCULAR; INTRAVENOUS
Status: COMPLETED | OUTPATIENT
Start: 2024-06-03 | End: 2024-06-03

## 2024-06-03 RX ADMIN — KETOROLAC TROMETHAMINE 30 MG: 30 INJECTION, SOLUTION INTRAMUSCULAR at 06:06

## 2024-06-03 NOTE — DISCHARGE INSTRUCTIONS
For pain, take ibuprofen every 6 hours as needed.  You may rotate this medication with over-the-counter Tylenol.      Additionally, you may take methocarbamol up to 3 times daily for muscle pain and spasms.  This medication may make you drowsy, recommend taking at night.    Also recommend applying heat, ice to the area of discomfort.  Recommend light stretching.    Recommend follow up with primary care if symptoms persist.  Return to ER for worsening symptoms.
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
## 2025-04-14 ENCOUNTER — LAB REQUISITION (OUTPATIENT)
Dept: LAB | Facility: HOSPITAL | Age: 60
End: 2025-04-14

## 2025-04-14 DIAGNOSIS — E78.5 HYPERLIPIDEMIA, UNSPECIFIED: ICD-10-CM

## 2025-04-14 DIAGNOSIS — E55.9 VITAMIN D DEFICIENCY, UNSPECIFIED: ICD-10-CM

## 2025-04-14 DIAGNOSIS — R53.83 OTHER FATIGUE: ICD-10-CM

## 2025-04-14 DIAGNOSIS — E11.9 TYPE 2 DIABETES MELLITUS WITHOUT COMPLICATIONS: ICD-10-CM

## 2025-04-14 LAB
25(OH)D3+25(OH)D2 SERPL-MCNC: 6 NG/ML (ref 30–80)
ALBUMIN SERPL-MCNC: 3.9 G/DL (ref 3.4–4.8)
ALBUMIN/GLOB SERPL: 1.2 RATIO (ref 1.1–2)
ALP SERPL-CCNC: 74 UNIT/L (ref 40–150)
ALT SERPL-CCNC: 21 UNIT/L (ref 0–55)
ANION GAP SERPL CALC-SCNC: 8 MEQ/L
AST SERPL-CCNC: 19 UNIT/L (ref 11–45)
BASOPHILS # BLD AUTO: 0.03 X10(3)/MCL
BASOPHILS NFR BLD AUTO: 0.5 %
BILIRUB SERPL-MCNC: 0.8 MG/DL
BUN SERPL-MCNC: 12.5 MG/DL (ref 8.4–25.7)
CALCIUM SERPL-MCNC: 9.2 MG/DL (ref 8.8–10)
CHLORIDE SERPL-SCNC: 107 MMOL/L (ref 98–107)
CHOLEST SERPL-MCNC: 184 MG/DL
CHOLEST/HDLC SERPL: 4 {RATIO} (ref 0–5)
CO2 SERPL-SCNC: 28 MMOL/L (ref 23–31)
CREAT SERPL-MCNC: 0.92 MG/DL (ref 0.72–1.25)
CREAT/UREA NIT SERPL: 14
EOSINOPHIL # BLD AUTO: 0.21 X10(3)/MCL (ref 0–0.9)
EOSINOPHIL NFR BLD AUTO: 3.8 %
ERYTHROCYTE [DISTWIDTH] IN BLOOD BY AUTOMATED COUNT: 12.4 % (ref 11.5–17)
EST. AVERAGE GLUCOSE BLD GHB EST-MCNC: 177.2 MG/DL
GFR SERPLBLD CREATININE-BSD FMLA CKD-EPI: >60 ML/MIN/1.73/M2
GLOBULIN SER-MCNC: 3.2 GM/DL (ref 2.4–3.5)
GLUCOSE SERPL-MCNC: 102 MG/DL (ref 82–115)
HBA1C MFR BLD: 7.8 %
HCT VFR BLD AUTO: 46.3 % (ref 42–52)
HDLC SERPL-MCNC: 44 MG/DL (ref 35–60)
HGB BLD-MCNC: 15.8 G/DL (ref 14–18)
IMM GRANULOCYTES # BLD AUTO: 0.02 X10(3)/MCL (ref 0–0.04)
IMM GRANULOCYTES NFR BLD AUTO: 0.4 %
LDLC SERPL CALC-MCNC: 122 MG/DL (ref 50–140)
LYMPHOCYTES # BLD AUTO: 1.37 X10(3)/MCL (ref 0.6–4.6)
LYMPHOCYTES NFR BLD AUTO: 24.9 %
MCH RBC QN AUTO: 31.2 PG (ref 27–31)
MCHC RBC AUTO-ENTMCNC: 34.1 G/DL (ref 33–36)
MCV RBC AUTO: 91.3 FL (ref 80–94)
MONOCYTES # BLD AUTO: 0.45 X10(3)/MCL (ref 0.1–1.3)
MONOCYTES NFR BLD AUTO: 8.2 %
NEUTROPHILS # BLD AUTO: 3.43 X10(3)/MCL (ref 2.1–9.2)
NEUTROPHILS NFR BLD AUTO: 62.2 %
NRBC BLD AUTO-RTO: 0 %
PLATELET # BLD AUTO: 164 X10(3)/MCL (ref 130–400)
PMV BLD AUTO: 11.6 FL (ref 7.4–10.4)
POTASSIUM SERPL-SCNC: 4.4 MMOL/L (ref 3.5–5.1)
PROT SERPL-MCNC: 7.1 GM/DL (ref 5.8–7.6)
PSA SERPL-MCNC: 0.59 NG/ML
RBC # BLD AUTO: 5.07 X10(6)/MCL (ref 4.7–6.1)
SODIUM SERPL-SCNC: 143 MMOL/L (ref 136–145)
T4 FREE SERPL-MCNC: 0.98 NG/DL (ref 0.7–1.48)
TRIGL SERPL-MCNC: 91 MG/DL (ref 34–140)
TSH SERPL-ACNC: 0.95 UIU/ML (ref 0.35–4.94)
VLDLC SERPL CALC-MCNC: 18 MG/DL
WBC # BLD AUTO: 5.51 X10(3)/MCL (ref 4.5–11.5)

## 2025-04-14 PROCEDURE — 80061 LIPID PANEL: CPT | Performed by: NURSE PRACTITIONER

## 2025-04-14 PROCEDURE — 80053 COMPREHEN METABOLIC PANEL: CPT | Performed by: NURSE PRACTITIONER

## 2025-04-14 PROCEDURE — 82306 VITAMIN D 25 HYDROXY: CPT | Performed by: NURSE PRACTITIONER

## 2025-04-14 PROCEDURE — 84439 ASSAY OF FREE THYROXINE: CPT | Performed by: NURSE PRACTITIONER

## 2025-04-14 PROCEDURE — 83036 HEMOGLOBIN GLYCOSYLATED A1C: CPT | Performed by: NURSE PRACTITIONER

## 2025-04-14 PROCEDURE — 85025 COMPLETE CBC W/AUTO DIFF WBC: CPT | Performed by: NURSE PRACTITIONER

## 2025-04-14 PROCEDURE — 84443 ASSAY THYROID STIM HORMONE: CPT | Performed by: NURSE PRACTITIONER

## 2025-04-14 PROCEDURE — 84153 ASSAY OF PSA TOTAL: CPT | Performed by: NURSE PRACTITIONER
